# Patient Record
Sex: MALE | Race: NATIVE HAWAIIAN OR OTHER PACIFIC ISLANDER | NOT HISPANIC OR LATINO | URBAN - METROPOLITAN AREA
[De-identification: names, ages, dates, MRNs, and addresses within clinical notes are randomized per-mention and may not be internally consistent; named-entity substitution may affect disease eponyms.]

---

## 2021-03-31 ENCOUNTER — EMERGENCY (EMERGENCY)
Facility: HOSPITAL | Age: 36
LOS: 1 days | Discharge: HOME | End: 2021-03-31
Attending: EMERGENCY MEDICINE | Admitting: EMERGENCY MEDICINE
Payer: OTHER MISCELLANEOUS

## 2021-03-31 VITALS
HEART RATE: 60 BPM | RESPIRATION RATE: 17 BRPM | SYSTOLIC BLOOD PRESSURE: 126 MMHG | OXYGEN SATURATION: 99 % | TEMPERATURE: 98 F | WEIGHT: 184.97 LBS | DIASTOLIC BLOOD PRESSURE: 68 MMHG

## 2021-03-31 VITALS
RESPIRATION RATE: 16 BRPM | HEART RATE: 59 BPM | SYSTOLIC BLOOD PRESSURE: 127 MMHG | TEMPERATURE: 98 F | OXYGEN SATURATION: 99 % | DIASTOLIC BLOOD PRESSURE: 76 MMHG

## 2021-03-31 DIAGNOSIS — S92.411A DISPLACED FRACTURE OF PROXIMAL PHALANX OF RIGHT GREAT TOE, INITIAL ENCOUNTER FOR CLOSED FRACTURE: ICD-10-CM

## 2021-03-31 DIAGNOSIS — M79.674 PAIN IN RIGHT TOE(S): ICD-10-CM
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
## 2021-03-31 PROCEDURE — 73660 X-RAY EXAM OF TOE(S): CPT | Mod: 26

## 2021-03-31 PROCEDURE — 99284 EMERGENCY DEPT VISIT MOD MDM: CPT | Mod: 25

## 2021-03-31 PROCEDURE — 73660 X-RAY EXAM OF TOE(S): CPT

## 2021-03-31 PROCEDURE — 73660 X-RAY EXAM OF TOE(S): CPT | Mod: 26,RT

## 2021-03-31 PROCEDURE — 96372 THER/PROPH/DIAG INJ SC/IM: CPT

## 2021-03-31 RX ORDER — ACETAMINOPHEN 500 MG
650 TABLET ORAL ONCE
Refills: 0 | Status: COMPLETED | OUTPATIENT
Start: 2021-03-31 | End: 2021-03-31

## 2021-03-31 RX ORDER — KETOROLAC TROMETHAMINE 30 MG/ML
30 SYRINGE (ML) INJECTION ONCE
Refills: 0 | Status: DISCONTINUED | OUTPATIENT
Start: 2021-03-31 | End: 2021-03-31

## 2021-03-31 RX ADMIN — Medication 30 MILLIGRAM(S): at 21:05

## 2021-03-31 RX ADMIN — Medication 650 MILLIGRAM(S): at 21:05

## 2021-03-31 NOTE — ED PROVIDER NOTE - OBJECTIVE STATEMENT
36M no PMH p/w R 1st toe pain. 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 Unsure exact mechanism but thinks his 1st hit hard directly into the ground and has R 1st toe pain since then, unable to bear weight 2/2 pain. Occurred ~1430 in SI, states his wife had to come and pick him up and he came to ER.   Denies any other injuries. Has not taken any pain meds. Denies hitting head, LOC, vision changes, focal weakness/numbness, neck/back pain, SOB/CP, HA, abd pain, NVD, black/bloody stool, urinary complaints, URI symptoms. Denies recent illnesses or medication changes. Not on AC.

## 2021-03-31 NOTE — ED PROVIDER NOTE - PHYSICAL EXAMINATION
+ecchymosis/ttp and swelling on dorsal/plantar aspect of TMP joint, extending distally to interphalangeal joint. normal cap refill. No crepitus, firmness, induration, fluctuance. Skin is normal temp. No erythema/warmth. No obvious skin breaks.   no other bony ttp, FROM all other extremities, no foot ttp. normal equal distal pulses. normal cap refill. sensation intact. no subungal hematoma.  no deformities +ecchymosis/ttp and swelling on dorsal/plantar aspect of TMP joint, extending distally to interphalangeal joint, distal phalanx. normal cap refill. No crepitus, firmness, induration, fluctuance. Skin is normal temp. No erythema/warmth. No obvious skin breaks.   no other bony ttp, FROM all other extremities, no foot ttp. normal equal distal pulses. normal cap refill. sensation intact. no subungal hematoma.  no deformities

## 2021-03-31 NOTE — CONSULT NOTE ADULT - ASSESSMENT
35yo M with no PMHx presents to [facility] ED after sustaining an injury to his right great toe after tripping over a wire at work earlier today.      37yo M with no PMHx presents to [facility] ED after sustaining an injury to his right great toe after tripping over a wire at work earlier today. Upon radiographic review, pt has a closed displaced distal phalanx fracture of the right great toe. [facility] so that he can be scheduled for podiatric surgery to fixate the fracture.     P:   Compressive dressing applied to right foot   WBAT to right heel in surgical shoe, assisted by cane or crutches (pt preference)    Please have patient follow-up as soon as possible w/ [facility]:   [address]   [address]   [place], [place] [id number] [phone]   37yo M with no PMHx presents to [facility] ED after sustaining an injury to his right great toe after tripping over a wire at work earlier today. Upon radiographic review, pt has a closed displaced distal phalanx fracture of the right great toe. Pt should follow-up with podiatrist at [facility] as soon as possible so that he can be scheduled for podiatric surgery to fixate the fracture.     P:   Compressive dressing applied to right foot   WBAT to right heel in surgical shoe, assisted by cane or crutches (pt preference)    Please have patient follow-up as soon as possible w/ [facility]:   [facility]  [address]   [address]   [place] [id number] [phone]

## 2021-03-31 NOTE — ED ADULT NURSE NOTE - OBJECTIVE STATEMENT
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 R big tie appears swollen/bruised.

## 2021-03-31 NOTE — CONSULT NOTE ADULT - SUBJECTIVE AND OBJECTIVE BOX
Attending: Elmer Mercedes DPM    Patient is a 36y old  Male who presents with a chief complaint of right great toe injury.     HPI: 36M no PMH p/w R 1st toe pain. 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 Unsure exact mechanism but thinks his 1st hit hard directly into the ground and has R 1st toe pain since then, unable to bear weight 2/2 pain. Occurred ~1430 in , states his wife had to come and pick him up and he came to ER.       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       LABS              Vital Signs Last 24 Hrs  T(C): 36.7 (31 Mar 2021 20:37), Max: 36.7 (31 Mar 2021 20:37)  T(F): 98.1 (31 Mar 2021 20:37), Max: 98.1 (31 Mar 2021 20:37)  HR: 60 (31 Mar 2021 20:37) (60 - 60)  BP: 126/68 (31 Mar 2021 20:37) (126/68 - 126/68)  BP(mean): --  RR: 17 (31 Mar 2021 20:37) (17 - 17)  SpO2: 99% (31 Mar 2021 20:37) (99% - 99%)    PHYSICAL EXAM  General: NAD, AA0x3    Lower Extremity Focused:  Vasc:  Derm:  Neuro:  MSK:     RADIOLOGY                       Attending: Elmer Mercedes DPM    Patient is a 36y old  Male who presents with a chief complaint of right great toe injury.     HPI: 36M no PMH p/w R 1st toe pain. 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 Unsure exact mechanism but thinks his 1st hit hard directly into the ground and has R 1st toe pain since then, unable to bear weight 2/2 pain. Occurred ~1430 in , states his wife had to come and pick him up and he came to ER.       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       LABS              Vital Signs Last 24 Hrs  T(C): 36.7 (31 Mar 2021 20:37), Max: 36.7 (31 Mar 2021 20:37)  T(F): 98.1 (31 Mar 2021 20:37), Max: 98.1 (31 Mar 2021 20:37)  HR: 60 (31 Mar 2021 20:37) (60 - 60)  BP: 126/68 (31 Mar 2021 20:37) (126/68 - 126/68)  BP(mean): --  RR: 17 (31 Mar 2021 20:37) (17 - 17)  SpO2: 99% (31 Mar 2021 20:37) (99% - 99%)    PHYSICAL EXAM  General: NAD, AA0x3    Lower Extremity Focused:  Vasc: DP/PT 2/4. CFT <2s x 5. TG warm to cool. Pedal hair present. Non-pitting edema surrounding right great toe.   Derm: Ecchymosis surrounding right great toe. Nail firmly embedded to nail bed.   Neuro: Protective sensation intact.   MSK: Pt unable to adequately DF/PF 2/2 pain.     RADIOLOGY  Resident Wet Read: displaced distal phalanx fx of right great toe.                      Attending: Elmer Mercedes DPM    Patient is a 36y old  Male who presents with a chief complaint of right great toe injury.     HPI: 36M no PMH p/w R 1st toe pain. 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 Unsure exact mechanism but thinks his 1st hit hard directly into the ground and has R 1st toe pain since then, unable to bear weight 2/2 pain. Occurred ~1430 in SI, states his wife had to come and pick him up and he came to ER. Pt endorses pain on palpation of right great toe. Pt endorses pins and needles sensation over the top of his right foot but states that this has been present following a right ankle fx repair ~5-6yrs ago.       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       LABS              Vital Signs Last 24 Hrs  T(C): 36.7 (31 Mar 2021 20:37), Max: 36.7 (31 Mar 2021 20:37)  T(F): 98.1 (31 Mar 2021 20:37), Max: 98.1 (31 Mar 2021 20:37)  HR: 60 (31 Mar 2021 20:37) (60 - 60)  BP: 126/68 (31 Mar 2021 20:37) (126/68 - 126/68)  BP(mean): --  RR: 17 (31 Mar 2021 20:37) (17 - 17)  SpO2: 99% (31 Mar 2021 20:37) (99% - 99%)    PHYSICAL EXAM  General: NAD, AA0x3    Lower Extremity Focused:  Vasc: DP/PT 2/4. CFT <2s x 5. TG warm to cool. Pedal hair present. Non-pitting edema surrounding right great toe.   Derm: Ecchymosis surrounding right great toe. Nail firmly embedded to nail bed.   Neuro: Protective sensation intact.   MSK: Pt unable to adequately DF/PF 2/2 pain.     RADIOLOGY  Resident Wet Read: displaced distal phalanx fx of right great toe.

## 2021-03-31 NOTE — ED PROVIDER NOTE - CLINICAL SUMMARY MEDICAL DECISION MAKING FREE TEXT BOX
36M no PMH p/w R 1st toe pain. 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 Unsure exact mechanism but thinks his 1st hit hard directly into the ground and has R 1st toe pain since then, unable to bear weight 2/2 pain. Occurred ~1430 in SI, states his wife had to come and pick him up and he came to ER. No other injuries. Vitals wnl, exam as above. Very well appearing, neurovascularly intact.   ddx: Possible toe fx. mechanical fall.   xr, symptom control, reassess.  ice pack applied.

## 2021-03-31 NOTE — ED ADULT NURSE NOTE - NSIMPLEMENTINTERV_GEN_ALL_ED
Implemented All Fall Risk Interventions:  [place] to call system. Call bell, personal items and telephone within reach. Instruct patient to call for assistance. Room bathroom lighting operational. Non-slip footwear when patient is off stretcher. Physically safe environment: no spills, clutter or unnecessary equipment. Stretcher in lowest position, wheels locked, appropriate side rails in place. Provide visual cue, wrist band, yellow gown, etc. Monitor gait and stability. Monitor for mental status changes and reorient to person, place, and time. Review medications for side effects contributing to fall risk. Reinforce activity limits and safety measures with patient and family.

## 2021-03-31 NOTE — ED PROVIDER NOTE - NSFOLLOWUPINSTRUCTIONS_ED_ALL_ED_FT
Can take tylenol 650mg or motrin 600mg (May cause stomach irritation - take with food and avoid prolonged use) every 6hrs as needed for pain.  Keep foot slightly elevated when possible.   Stay well hydrated.  Return for fevers, persistent vomit, uncontrolled pain, worsening breathing, worsening lightheaded, worsening swelling.  Follow up with primary doctor within 1-2 days.   Follow up with podiatrist. Can call [phone] to schedule appointment.     Fracture    A fracture is a break in one of your bones. This can occur from a variety of injuries, especially traumatic ones. Symptoms include pain, bruising, or swelling. Do not use the injured limb. If a fracture is in one of the bones below your waist, do not put weight on that limb unless instructed to do so by your healthcare provider. Crutches or a cane may have been provided. A splint or cast may have been applied by your health care provider. Make sure to keep it dry and follow up with an orthopedist as instructed.    SEEK IMMEDIATE MEDICAL CARE IF YOU HAVE ANY OF THE FOLLOWING SYMPTOMS: numbness, tingling, increasing pain, or weakness in any part of the injured limb. Follow up tomorrow with podiatrist (call 9 am for same-day appointment):  [facility]:   [address]   [address]   [place], [place] [id number] [phone]    Can take tylenol 650mg or motrin 600mg (May cause stomach irritation - take with food and avoid prolonged use) every 6hrs as needed for pain.  Non-weight bearing on the affected foot (use crutches, heel walking okay if necessary).  Keep foot slightly elevated when possible.   Stay well hydrated.  Return for fevers, persistent vomit, uncontrolled pain, worsening breathing, worsening lightheaded, worsening swelling.  Follow up with primary doctor within 1-2 days.         Fracture    A fracture is a break in one of your bones. This can occur from a variety of injuries, especially traumatic ones. Symptoms include pain, bruising, or swelling. Do not use the injured limb. If a fracture is in one of the bones below your waist, do not put weight on that limb unless instructed to do so by your healthcare provider. Crutches or a cane may have been provided. A splint or cast may have been applied by your health care provider. Make sure to keep it dry and follow up with an orthopedist as instructed.    SEEK IMMEDIATE MEDICAL CARE IF YOU HAVE ANY OF THE FOLLOWING SYMPTOMS: numbness, tingling, increasing pain, or weakness in any part of the injured limb.

## 2021-03-31 NOTE — ED ADULT NURSE NOTE - NSFALLRSKPSTHSTOCCUR_ED_ALL_ED
----- Message from Shayla Ramirez MA sent at 12/24/2019 10:00 AM CST -----  Contact: Self      ----- Message -----  From: Jing Carrera  Sent: 12/24/2019   9:28 AM CST  To: [facility] TS Staff    Patient returning a call about her 12/30/19 appt. She states that she would like to keep her appt because of the seriousness of her condition right now. She feels she may end up in the hospital if she does not get treated soon. Please call patient back at [phone]    
Rescheduled patient to see April on 12/31/2019.      
Single Mechanical/Accidental Fall

## 2021-03-31 NOTE — ED PROVIDER NOTE - PROGRESS NOTE DETAILS
[place]: pain improving. XR w/ displaced distal phalanx fractures and mild fx of proximal phalanx. Given fx extent, will consult podiatry. no call back yet. signed out pt pending podiatry eval. updated pt. Liseth: podiatry called back, finishing case in OR and will evaluate shortly. Liseth: seen by podiatry.  Cleared for DC, NWBS, heel walking okay if necessary.  To follow up tomorrow with podiatrist in the office.

## 2021-03-31 NOTE — ED PROVIDER NOTE - PATIENT PORTAL LINK FT
You can access the FollowMyHealth Patient Portal offered by [facility] by registering at the following website: [email or web address]. By joining [facility]’s FollowMyHealth portal, you will also be able to view your health information using other applications (apps) compatible with our system.

## 2023-08-24 ENCOUNTER — APPOINTMENT (OUTPATIENT)
Dept: ORTHOPEDIC SURGERY | Facility: CLINIC | Age: 38
End: 2023-08-24

## 2023-10-23 PROBLEM — Z00.00 ENCOUNTER FOR PREVENTIVE HEALTH EXAMINATION: Status: ACTIVE | Noted: 2023-10-23
